# Patient Record
Sex: MALE | Race: WHITE | NOT HISPANIC OR LATINO
[De-identification: names, ages, dates, MRNs, and addresses within clinical notes are randomized per-mention and may not be internally consistent; named-entity substitution may affect disease eponyms.]

---

## 2022-09-06 ENCOUNTER — OFFICE VISIT (OUTPATIENT)
Dept: MEDICAL GROUP | Facility: CLINIC | Age: 32
End: 2022-09-06
Payer: MEDICAID

## 2022-09-06 DIAGNOSIS — F32.89 OTHER DEPRESSION: ICD-10-CM

## 2022-09-06 PROCEDURE — 90834 PSYTX W PT 45 MINUTES: CPT | Performed by: PSYCHOLOGIST

## 2022-09-06 NOTE — PROGRESS NOTES
[facility]  Psychotherapy Consult      Patient Name: Shun Garrison  Patient MRN: [id number]  Today's Date:  9/6/22    Type of session: intake assessment  Session start time: 10   Session stop time: 10:45  Length of time spent face to face with patient: 45  Persons in attendance: patient    Diagnoses:  Depression, MAINOR in early remission    Pt. States that he has been depressed most of his life.  Struggles with no motivation..  Anxiety in the mornings,  SI but no plan or intent.  No guns in the house.    Has been seen in town, recently by Fei CRUZ, received meds.  Discontinued meds one month ago as he felt they were not helpful.    Lives with parents, attends [employer] and is studying nursing.  Works part time at [employer].    Was in a relationship for six years, broke up but they remain very close.    Has dreams for the future, wants to help people and to contribute to society.    History as a  16 year old of suicide attempts and hospitalization at [facility].  Took pills at that time.    States he has had serious problems with alcohol, no other drugs since a teen.  Stopped drinking on his own 2 months ago.    Does not exercise regularly.  Weight is up and down.  States he does not sleep well.    Open to consistent therapy.  Referred to [facility], [facility], [facility], and [facility].  And can f/u with this writer for consultation.          Brie Cartwright, Ph.D.

## 2022-09-20 ENCOUNTER — APPOINTMENT (OUTPATIENT)
Dept: MEDICAL GROUP | Facility: CLINIC | Age: 32
End: 2022-09-20
Payer: MEDICAID